# Patient Record
Sex: MALE | Race: BLACK OR AFRICAN AMERICAN | NOT HISPANIC OR LATINO | Employment: FULL TIME | URBAN - METROPOLITAN AREA
[De-identification: names, ages, dates, MRNs, and addresses within clinical notes are randomized per-mention and may not be internally consistent; named-entity substitution may affect disease eponyms.]

---

## 2024-03-02 ENCOUNTER — OFFICE VISIT (OUTPATIENT)
Dept: URGENT CARE | Facility: URGENT CARE | Age: 34
End: 2024-03-02
Payer: OTHER MISCELLANEOUS

## 2024-03-02 VITALS
HEART RATE: 82 BPM | OXYGEN SATURATION: 99 % | WEIGHT: 174 LBS | TEMPERATURE: 97.6 F | RESPIRATION RATE: 18 BRPM | SYSTOLIC BLOOD PRESSURE: 97 MMHG | DIASTOLIC BLOOD PRESSURE: 62 MMHG

## 2024-03-02 DIAGNOSIS — S83.91XA SPRAIN OF RIGHT KNEE, UNSPECIFIED LIGAMENT, INITIAL ENCOUNTER: Primary | ICD-10-CM

## 2024-03-02 PROCEDURE — 99203 OFFICE O/P NEW LOW 30 MIN: CPT | Performed by: FAMILY MEDICINE

## 2024-03-02 NOTE — LETTER
March 2, 2024      Chato Lerma  [id number]  [address]  [facility] [address]        To Whom It May Concern:      Chato Lerma was seen on 03/02/2024.  Please excuse his work absence for today and tomorrow. He can go back to work on 03/04/2024 if feels better clinically.       Let us know if there are any questions.      Sincerely,        Darrian Romero MD

## 2024-03-03 NOTE — PROGRESS NOTES
Assessment & Plan     Sprain of right knee, unspecified ligament, initial encounter  Differentials discussed in detail and symptoms likely secondary to right knee sprain injury.  Will hold off imaging for now.  Recommended rest, icing, over-the-counter oral/topical analgesia and knee brace.  Workability note provided and instructed to follow-up if symptoms persist.  Patient understood and in agreement with above plan.  All questions answered.      Sincere Reis is a 33 year old, presenting for the following health issues:  Work Comp (X 1 day right knee, around 4 pm)    HPI     Concern -   Onset: yesterday   Description: right knee got caught with items on shelves while walking through kitchen at workplace, hyperextended    Intensity: 7/10  Progression of Symptoms:  same  Accompanying Signs & Symptoms: No fall, head injury or loss of consciousness  Previous history of similar problem:   Therapies tried and outcome: tylenol   Supervisor at [employer]       Review of Systems  Constitutional, HEENT, cardiovascular, pulmonary, gi and gu systems are negative, except as otherwise noted.      Objective    BP 97/62 (BP Location: Left arm, Patient Position: Chair, Cuff Size: Adult Regular)   Pulse 82   Temp 97.6  F (36.4  C)   Resp 18   Wt 78.9 kg (174 lb)   SpO2 99%   There is no height or weight on file to calculate BMI.  Physical Exam   GENERAL: alert and no distress  EYES: Eyes grossly normal to inspection, PERRL and conjunctivae and sclerae normal  HENT: normal cephalic/atraumatic, nose and mouth without ulcers or lesions, oropharynx clear, and oral mucous membranes moist  RESP: no wheezes  MS: Subjective pain involving right knee anteriorly, no joint laxity, swelling, skin discoloration or tenderness noted, range of movement normal, gait slightly antalgic, normal lower extremities strength, peripheral pulses 3+, sensation to touch and pressure intact  NEURO: Normal strength and tone, mentation intact  and speech normal  PSYCH: mentation appears normal, affect normal/bright      Signed Electronically by: Darrian Romero MD